# Patient Record
Sex: MALE | Race: WHITE | NOT HISPANIC OR LATINO | Employment: OTHER | URBAN - METROPOLITAN AREA
[De-identification: names, ages, dates, MRNs, and addresses within clinical notes are randomized per-mention and may not be internally consistent; named-entity substitution may affect disease eponyms.]

---

## 2017-02-21 ENCOUNTER — OFFICE VISIT (OUTPATIENT)
Dept: URGENT CARE | Facility: CLINIC | Age: 72
End: 2017-02-21
Payer: MEDICARE

## 2017-02-21 PROCEDURE — G0463 HOSPITAL OUTPT CLINIC VISIT: HCPCS

## 2017-02-21 PROCEDURE — 99214 OFFICE O/P EST MOD 30 MIN: CPT

## 2017-02-21 PROCEDURE — 93005 ELECTROCARDIOGRAM TRACING: CPT

## 2017-02-28 ENCOUNTER — GENERIC CONVERSION - ENCOUNTER (OUTPATIENT)
Dept: OTHER | Facility: OTHER | Age: 72
End: 2017-02-28

## 2017-05-03 ENCOUNTER — ALLSCRIPTS OFFICE VISIT (OUTPATIENT)
Dept: OTHER | Facility: OTHER | Age: 72
End: 2017-05-03

## 2017-05-03 DIAGNOSIS — G47.00 INSOMNIA: ICD-10-CM

## 2017-05-03 DIAGNOSIS — I50.9 HEART FAILURE (HCC): ICD-10-CM

## 2017-05-03 DIAGNOSIS — Z12.5 ENCOUNTER FOR SCREENING FOR MALIGNANT NEOPLASM OF PROSTATE: ICD-10-CM

## 2017-05-03 DIAGNOSIS — R53.83 OTHER FATIGUE: ICD-10-CM

## 2017-05-03 DIAGNOSIS — R25.1 TREMOR: ICD-10-CM

## 2017-05-03 DIAGNOSIS — I10 ESSENTIAL (PRIMARY) HYPERTENSION: ICD-10-CM

## 2017-05-03 DIAGNOSIS — R06.02 SHORTNESS OF BREATH: ICD-10-CM

## 2017-05-10 ENCOUNTER — GENERIC CONVERSION - ENCOUNTER (OUTPATIENT)
Dept: OTHER | Facility: OTHER | Age: 72
End: 2017-05-10

## 2017-06-01 ENCOUNTER — GENERIC CONVERSION - ENCOUNTER (OUTPATIENT)
Dept: OTHER | Facility: OTHER | Age: 72
End: 2017-06-01

## 2017-06-13 ENCOUNTER — GENERIC CONVERSION - ENCOUNTER (OUTPATIENT)
Dept: OTHER | Facility: OTHER | Age: 72
End: 2017-06-13

## 2017-08-09 ENCOUNTER — ALLSCRIPTS OFFICE VISIT (OUTPATIENT)
Dept: OTHER | Facility: OTHER | Age: 72
End: 2017-08-09

## 2017-08-09 DIAGNOSIS — M79.89 OTHER DISORDERS OF SOFT TISSUE: ICD-10-CM

## 2017-09-05 ENCOUNTER — GENERIC CONVERSION - ENCOUNTER (OUTPATIENT)
Dept: OTHER | Facility: OTHER | Age: 72
End: 2017-09-05

## 2017-09-07 ENCOUNTER — ALLSCRIPTS OFFICE VISIT (OUTPATIENT)
Dept: OTHER | Facility: OTHER | Age: 72
End: 2017-09-07

## 2017-10-06 ENCOUNTER — GENERIC CONVERSION - ENCOUNTER (OUTPATIENT)
Dept: OTHER | Facility: OTHER | Age: 72
End: 2017-10-06

## 2017-10-10 ENCOUNTER — ALLSCRIPTS OFFICE VISIT (OUTPATIENT)
Dept: OTHER | Facility: OTHER | Age: 72
End: 2017-10-10

## 2017-10-13 NOTE — PROGRESS NOTES
Assessment  1  Congestive heart failure (428 0) (I50 9)    Discussion/Summary  Discussion Summary:   Will continue current regime  Continue with monthly visits as pt is terminally ill  Chief Complaint  Chief Complaint Free Text Note Form: Pt presents today for 1 month follow up  Pt states that edema is a little better but still has SOB with exertion  History of Present Illness  HPI: Pt presents for follow up  His lower extremity swelling has improved since his cardiologist placed him on metalazone  His SOB has also improved a little bit  He is no longer wearing the life vest  His weight is down since last visit and likely due to no longer being fluid overloaded  Review of Systems  Complete-Male:   Constitutional: as noted in HPI  Eyes: No complaints of eye pain, no red eyes, no discharge from eyes, no itchy eyes  ENT: no complaints of earache, no hearing loss, no nosebleeds, no nasal discharge, no sore throat, no hoarseness  Cardiovascular: No complaints of slow heart rate, no fast heart rate, no chest pain, no palpitations, no leg claudication, no lower extremity  Respiratory: No complaints of shortness of breath, no wheezing, no cough, no SOB on exertion, no orthopnea or PND  Gastrointestinal: No complaints of abdominal pain, no constipation, no nausea or vomiting, no diarrhea or bloody stools  Genitourinary: No complaints of dysuria, no incontinence, no hesitancy, no nocturia, no genital lesion, no testicular pain  Musculoskeletal: No complaints of arthralgia, no myalgias, no joint swelling or stiffness, no limb pain or swelling  Integumentary: No complaints of skin rash or skin lesions, no itching, no skin wound, no dry skin  Neurological: No compliants of headache, no confusion, no convulsions, no numbness or tingling, no dizziness or fainting, no limb weakness, no difficulty walking     Hematologic/Lymphatic: No complaints of swollen glands, no swollen glands in the neck, does not bleed easily, no easy bruising  ROS Reviewed:   ROS reviewed  Active Problems  1  Atrial fibrillation (427 31) (I48 91)   2  Congestive heart failure (428 0) (I50 9)   3  Encounter for screening for malignant neoplasm of prostate (V76 44) (Z12 5)   4  Fatigue (780 79) (R53 83)   5  Hyperlipidemia (272 4) (E78 5)   6  Hypertension (401 9) (I10)   7  Insomnia (780 52) (G47 00)   8  Myelodysplasia (myelodysplastic syndrome) (238 75) (D46 9)   9  Screening for colon cancer (V76 51) (Z12 11)   10  Sebaceous cyst (706 2) (L72 3)   11  Shortness of breath (786 05) (R06 02)   12  Swelling of extremity (729 81) (M79 89)   13  Tremor (781 0) (R25 1)    Past Medical History  1  History of Cellulitis of hand, left (682 4) (L03 114)   2  History of low back pain (V13 59) (Z87 39)   3  History of Swelling of right hand (729 81) (M79 89)  Active Problems And Past Medical History Reviewed: The active problems and past medical history were reviewed and updated today  Surgical History  1  History of Brain Surgery   2  History of Hernia Repair   3  History of Leg Repair   4  History of Surgery For Abdominal Aortic Aneurysm  Surgical History Reviewed: The surgical history was reviewed and updated today  Family History  Mother    1  No pertinent family history  Father    2  Adopted (V68 89) (Z02 82)  Family History Reviewed: The family history was reviewed and updated today  Social History   · Former smoker (K86 18) (H03 582)   · No alcohol use   · Retired  Social History Reviewed: The social history was reviewed and updated today  The social history was reviewed and is unchanged  Current Meds   1  Lipitor 20 MG Oral Tablet; TAKE 1 TABLET DAILY; Therapy: 87MMI6600 to (Evaluate:26Tew2864) Recorded   2  MetOLazone 2 5 MG Oral Tablet; take 1 tablet daily as needed; Therapy: (Recorded:10Oct2017) to Recorded   3  Oxazepam 10 MG Oral Capsule; TAKE 1 CAPSULE Bedtime;    Therapy: 01SNR3557 to 24 200435); Last Rx:[id number] Ordered   4  Potassium Chloride ER 20 MEQ Oral Tablet Extended Release; Take 1 tablet daily; Therapy: 65QDR2856 to (Evaluate:28Apr2018) Recorded   5  Toprol XL 50 MG Oral Tablet Extended Release 24 Hour; take 0 5 tablet daily; Therapy: 88TNE4575 to Recorded   6  Torsemide 20 MG Oral Tablet; TAKE 1 TABLET DAILY; Therapy: 63DLN7458 to (Evaluate:30Oct2017) Recorded  Medication List Reviewed: The medication list was reviewed and updated today  Allergies  1  No Known Drug Allergies    Vitals  Vital Signs    Recorded: 19ZBK2214 02:30PM   Temperature 97 9 F   Heart Rate 84   Respiration 20   Systolic 812   Diastolic 64   Height 6 ft    Weight 243 lb    BMI Calculated 32 96   BSA Calculated 2 31   O2 Saturation 98     Physical Exam    Constitutional   General appearance: No acute distress, well appearing and well nourished  Pulmonary   Respiratory effort: No increased work of breathing or signs of respiratory distress  Auscultation of lungs: Clear to auscultation, equal breath sounds bilaterally, no wheezes, no rales, no rhonci  Cardiovascular   Auscultation of heart: Normal rate and rhythm, normal S1 and S2, without murmurs  Examination of extremities for edema and/or varicosities: Normal     Carotid pulses: Normal     Abdomen   Abdomen: Non-tender, no masses  Musculoskeletal   Gait and station: Normal     Digits and nails: Normal without clubbing or cyanosis  Inspection/palpation of joints, bones, and muscles: Normal     Skin   Skin and subcutaneous tissue: Normal without rashes or lesions      Psychiatric   Orientation to person, place and time: Normal     Mood and affect: Normal          Future Appointments    Date/Time Provider Specialty Site   11/09/2017 02:15 PM Michelle Andrade [address], [address]   Electronically signed by : Ayaz Munguia [facility]; Oct 11 2017  2:52PM EST                       (Author) Electronically signed by : AIDA Sepulveda ; Oct 11 2017  3:34PM EST                       (Author)

## 2017-11-03 ENCOUNTER — GENERIC CONVERSION - ENCOUNTER (OUTPATIENT)
Dept: OTHER | Facility: OTHER | Age: 72
End: 2017-11-03

## 2017-11-07 ENCOUNTER — GENERIC CONVERSION - ENCOUNTER (OUTPATIENT)
Dept: OTHER | Facility: OTHER | Age: 72
End: 2017-11-07

## 2017-11-09 ENCOUNTER — ALLSCRIPTS OFFICE VISIT (OUTPATIENT)
Dept: OTHER | Facility: OTHER | Age: 72
End: 2017-11-09

## 2017-12-20 ENCOUNTER — GENERIC CONVERSION - ENCOUNTER (OUTPATIENT)
Dept: FAMILY MEDICINE CLINIC | Facility: CLINIC | Age: 72
End: 2017-12-20

## 2018-01-12 VITALS
SYSTOLIC BLOOD PRESSURE: 100 MMHG | HEIGHT: 72 IN | WEIGHT: 243 LBS | TEMPERATURE: 97.9 F | RESPIRATION RATE: 20 BRPM | DIASTOLIC BLOOD PRESSURE: 64 MMHG | OXYGEN SATURATION: 98 % | HEART RATE: 84 BPM | BODY MASS INDEX: 32.91 KG/M2

## 2018-01-12 VITALS
SYSTOLIC BLOOD PRESSURE: 98 MMHG | BODY MASS INDEX: 35.21 KG/M2 | HEART RATE: 84 BPM | HEIGHT: 72 IN | TEMPERATURE: 96.7 F | WEIGHT: 260 LBS | DIASTOLIC BLOOD PRESSURE: 62 MMHG | RESPIRATION RATE: 24 BRPM

## 2018-01-13 VITALS
WEIGHT: 240 LBS | HEIGHT: 72 IN | RESPIRATION RATE: 22 BRPM | TEMPERATURE: 98.4 F | SYSTOLIC BLOOD PRESSURE: 132 MMHG | DIASTOLIC BLOOD PRESSURE: 78 MMHG | HEART RATE: 88 BPM | BODY MASS INDEX: 32.51 KG/M2

## 2018-01-14 VITALS
WEIGHT: 257 LBS | DIASTOLIC BLOOD PRESSURE: 60 MMHG | RESPIRATION RATE: 24 BRPM | SYSTOLIC BLOOD PRESSURE: 98 MMHG | TEMPERATURE: 96.7 F | BODY MASS INDEX: 34.81 KG/M2 | HEIGHT: 72 IN | HEART RATE: 92 BPM

## 2018-01-15 VITALS
RESPIRATION RATE: 18 BRPM | OXYGEN SATURATION: 90 % | DIASTOLIC BLOOD PRESSURE: 64 MMHG | HEIGHT: 72 IN | SYSTOLIC BLOOD PRESSURE: 102 MMHG | WEIGHT: 235.38 LBS | TEMPERATURE: 97.9 F | BODY MASS INDEX: 31.88 KG/M2 | HEART RATE: 116 BPM

## 2018-01-31 RX ORDER — METOPROLOL SUCCINATE 50 MG/1
0.5 TABLET, EXTENDED RELEASE ORAL DAILY
COMMUNITY
Start: 2017-05-03 | End: 2018-03-01

## 2018-01-31 RX ORDER — ATORVASTATIN CALCIUM 20 MG/1
1 TABLET, FILM COATED ORAL DAILY
COMMUNITY
Start: 2017-08-09

## 2018-01-31 RX ORDER — POTASSIUM CHLORIDE 1500 MG/1
1 TABLET, FILM COATED, EXTENDED RELEASE ORAL DAILY
COMMUNITY
Start: 2017-05-03

## 2018-01-31 RX ORDER — METOLAZONE 2.5 MG/1
1 TABLET ORAL DAILY PRN
COMMUNITY

## 2018-01-31 RX ORDER — TORSEMIDE 20 MG/1
1 TABLET ORAL DAILY
COMMUNITY
Start: 2017-05-03

## 2018-01-31 RX ORDER — OXAZEPAM 10 MG
1 CAPSULE ORAL
COMMUNITY
Start: 2017-05-03 | End: 2018-02-02 | Stop reason: SDUPTHER

## 2018-02-01 ENCOUNTER — OFFICE VISIT (OUTPATIENT)
Dept: INTERNAL MEDICINE CLINIC | Facility: CLINIC | Age: 73
End: 2018-02-01
Payer: MEDICARE

## 2018-02-01 VITALS
OXYGEN SATURATION: 98 % | BODY MASS INDEX: 30.93 KG/M2 | SYSTOLIC BLOOD PRESSURE: 118 MMHG | DIASTOLIC BLOOD PRESSURE: 68 MMHG | RESPIRATION RATE: 18 BRPM | WEIGHT: 241 LBS | HEIGHT: 74 IN | TEMPERATURE: 97.4 F | HEART RATE: 52 BPM

## 2018-02-01 DIAGNOSIS — G47.00 INSOMNIA, UNSPECIFIED TYPE: Primary | ICD-10-CM

## 2018-02-01 DIAGNOSIS — Z12.11 SCREENING FOR COLON CANCER: ICD-10-CM

## 2018-02-01 DIAGNOSIS — I87.2 VENOUS STASIS DERMATITIS OF BOTH LOWER EXTREMITIES: Primary | ICD-10-CM

## 2018-02-01 PROBLEM — I48.91 ATRIAL FIBRILLATION (HCC): Status: ACTIVE | Noted: 2017-08-10

## 2018-02-01 PROBLEM — F41.9 ANXIETY: Status: ACTIVE | Noted: 2017-04-05

## 2018-02-01 PROBLEM — Z96.89 S/P DEEP BRAIN STIMULATOR PLACEMENT: Status: ACTIVE | Noted: 2017-04-05

## 2018-02-01 PROBLEM — E78.5 HYPERLIPIDEMIA: Status: ACTIVE | Noted: 2017-08-09

## 2018-02-01 PROBLEM — N18.30 CKD (CHRONIC KIDNEY DISEASE) STAGE 3, GFR 30-59 ML/MIN (HCC): Status: ACTIVE | Noted: 2017-04-05

## 2018-02-01 PROBLEM — D46.Z MDS (MYELODYSPLASTIC SYNDROME), HIGH GRADE (HCC): Status: ACTIVE | Noted: 2017-06-01

## 2018-02-01 PROBLEM — I50.23 ACUTE ON CHRONIC SYSTOLIC CHF (CONGESTIVE HEART FAILURE) (HCC): Status: ACTIVE | Noted: 2017-04-21

## 2018-02-01 PROBLEM — Z86.73 HISTORY OF STROKE: Status: ACTIVE | Noted: 2017-04-05

## 2018-02-01 PROBLEM — I25.5 CARDIOMYOPATHY, ISCHEMIC: Status: ACTIVE | Noted: 2017-10-06

## 2018-02-01 PROBLEM — R06.00 DYSPNEA: Status: ACTIVE | Noted: 2017-04-05

## 2018-02-01 PROBLEM — I50.22 CHRONIC SYSTOLIC CHF (CONGESTIVE HEART FAILURE) (HCC): Status: ACTIVE | Noted: 2017-06-12

## 2018-02-01 PROCEDURE — 99214 OFFICE O/P EST MOD 30 MIN: CPT | Performed by: PHYSICIAN ASSISTANT

## 2018-02-01 RX ORDER — DIPHENOXYLATE HYDROCHLORIDE AND ATROPINE SULFATE 2.5; .025 MG/1; MG/1
1 TABLET ORAL
COMMUNITY
Start: 2008-02-19

## 2018-02-01 RX ORDER — DOCUSATE SODIUM 100 MG/1
1 CAPSULE, LIQUID FILLED ORAL
COMMUNITY

## 2018-02-01 RX ORDER — OXAZEPAM 10 MG
10 CAPSULE ORAL
Qty: 90 CAPSULE | Refills: 3 | OUTPATIENT
Start: 2018-02-01 | End: 2018-02-02

## 2018-02-01 NOTE — ASSESSMENT & PLAN NOTE
Continue current medications  Will refer to vascular for an opinion  Pt will likely need vascular studies but unsure if he is willing to proceed with these   Consider medical management with Pletal?

## 2018-02-01 NOTE — PROGRESS NOTES
Assessment/Plan:    Venous stasis dermatitis of both lower extremities  Continue current medications  Will refer to vascular for an opinion  Pt will likely need vascular studies but unsure if he is willing to proceed with these  Consider medical management with Pletal?       Diagnoses and all orders for this visit:    Venous stasis dermatitis of both lower extremities  -     Ambulatory referral to Vascular Surgery; Future    Screening for colon cancer  -     Ambulatory referral to Gastroenterology; Future  -     Ambulatory referral to Gastroenterology; Future    Other orders  -     atorvastatin (LIPITOR) 20 mg tablet; Take 1 tablet by mouth daily  -     metolazone (ZAROXOLYN) 2 5 mg tablet; Take 1 tablet by mouth daily as needed  -     oxazepam (SERAX) 10 mg capsule; Take 1 capsule by mouth  -     Potassium Chloride ER 20 MEQ TBCR; Take 1 tablet by mouth daily  -     metoprolol succinate (TOPROL XL) 50 mg 24 hr tablet; Take 0 5 tablets by mouth daily  -     torsemide (DEMADEX) 20 mg tablet; Take 1 tablet by mouth daily  -     docusate sodium (COLACE) 100 mg capsule; Take 1 capsule by mouth  -     multivitamin (THERAGRAN) TABS; Take 1 tablet by mouth          Subjective:      Patient ID: Shay Martinez is a 67 y o  male  Pt presents complaining of bilateral lower extremity swelling, discoloration and discomfort  He has some ulcer formation consistent with venous stasis ulcers  He has a hx of CHF and lower extremity edema that is managed fairly well with his diuretics  His legs feel heavy and uncomfortable  He is unsure how aggressive he would like to be with this as he has high grade MDS  Leg Pain    The incident occurred more than 1 week ago  There was no injury mechanism  The pain is present in the left ankle, left foot, left toes, right ankle, right foot and right toes  The pain is at a severity of 5/10  The pain is mild  The pain has been fluctuating since onset   Associated symptoms include a loss of sensation  He reports no foreign bodies present  The symptoms are aggravated by movement  Treatments tried: diuretic  The treatment provided mild relief  The following portions of the patient's history were reviewed and updated as appropriate:   He  has a past medical history of Anxiety (4/5/2017); CKD (chronic kidney disease) stage 3, GFR 30-59 ml/min (4/5/2017); Hypertension (5/20/2015); MDS (myelodysplastic syndrome), high grade ([facility] [address] ) (6/1/2017); and Pancytopenia ([facility] [address] ) (12/15/2016)  He  does not have any pertinent problems on file  He  has no past surgical history on file  His family history is not on file  He was adopted  He  reports that he has never smoked  He has never used smokeless tobacco  He reports that he does not drink alcohol or use drugs  Current Outpatient Prescriptions   Medication Sig Dispense Refill    atorvastatin (LIPITOR) 20 mg tablet Take 1 tablet by mouth daily      docusate sodium (COLACE) 100 mg capsule Take 1 capsule by mouth      multivitamin (THERAGRAN) TABS Take 1 tablet by mouth      oxazepam (SERAX) 10 mg capsule Take 1 capsule by mouth      Potassium Chloride ER 20 MEQ TBCR Take 1 tablet by mouth daily      torsemide (DEMADEX) 20 mg tablet Take 1 tablet by mouth daily      metolazone (ZAROXOLYN) 2 5 mg tablet Take 1 tablet by mouth daily as needed      metoprolol succinate (TOPROL XL) 50 mg 24 hr tablet Take 0 5 tablets by mouth daily       No current facility-administered medications for this visit  No current outpatient prescriptions on file prior to visit  No current facility-administered medications on file prior to visit  He has No Known Allergies       Review of Systems   Constitutional: Negative for chills and fever  HENT: Negative for congestion, ear pain, hearing loss, postnasal drip, rhinorrhea, sinus pain, sinus pressure, sore throat and trouble swallowing  Eyes: Negative for pain and visual disturbance     Respiratory: Negative for cough, chest tightness, shortness of breath and wheezing  Cardiovascular: Positive for leg swelling  Negative for chest pain and palpitations  Gastrointestinal: Negative for abdominal pain, blood in stool, constipation, diarrhea, nausea and vomiting  Endocrine: Negative for cold intolerance, heat intolerance, polydipsia, polyphagia and polyuria  Genitourinary: Negative for difficulty urinating, dysuria, flank pain and urgency  Musculoskeletal: Negative for arthralgias, back pain, gait problem and myalgias  Skin: Negative for rash  Allergic/Immunologic: Negative  Neurological: Negative for dizziness, weakness, light-headedness and headaches  Hematological: Negative  Psychiatric/Behavioral: Negative for behavioral problems, dysphoric mood and sleep disturbance  The patient is not nervous/anxious  Objective:     Physical Exam   Constitutional: He is oriented to person, place, and time  He appears well-developed and well-nourished  No distress  HENT:   Head: Normocephalic and atraumatic  Right Ear: External ear normal    Left Ear: External ear normal    Nose: Nose normal    Mouth/Throat: Oropharynx is clear and moist  No oropharyngeal exudate  Eyes: Conjunctivae and EOM are normal  Pupils are equal, round, and reactive to light  Right eye exhibits no discharge  Left eye exhibits no discharge  No scleral icterus  Neck: Normal range of motion  Neck supple  No thyromegaly present  Cardiovascular: Normal rate and normal heart sounds  An irregular rhythm present  Exam reveals decreased pulses  Exam reveals no gallop and no friction rub  No murmur heard  Pulmonary/Chest: Effort normal and breath sounds normal  No respiratory distress  He has no wheezes  He has no rales  Abdominal: Soft  Bowel sounds are normal  He exhibits no distension  There is no tenderness  Musculoskeletal: Normal range of motion  He exhibits no edema, tenderness or deformity     Neurological: He is alert and oriented to person, place, and time  No cranial nerve deficit  Skin: Skin is warm and dry  He is not diaphoretic  Red/purple discoloration to both lower extremity from the level of the shin downward consistent with poor circulation  Psychiatric: He has a normal mood and affect   His behavior is normal  Judgment and thought content normal

## 2018-02-02 DIAGNOSIS — F41.9 ANXIETY: Primary | ICD-10-CM

## 2018-02-02 DIAGNOSIS — F41.9 ANXIETY: ICD-10-CM

## 2018-02-02 RX ORDER — OXAZEPAM 10 MG
10 CAPSULE ORAL
Qty: 30 CAPSULE | Refills: 1 | OUTPATIENT
Start: 2018-02-02

## 2018-02-02 RX ORDER — OXAZEPAM 10 MG
10 CAPSULE ORAL
Qty: 30 CAPSULE | Refills: 0 | Status: SHIPPED | OUTPATIENT
Start: 2018-02-02 | End: 2018-03-01 | Stop reason: SDUPTHER

## 2018-02-02 NOTE — TELEPHONE ENCOUNTER
Pt need refill for his Oxazepam 10 mg take 1 at Bedtime  #90  Send to [address] [place] [address]    [phone]

## 2018-03-01 ENCOUNTER — OFFICE VISIT (OUTPATIENT)
Dept: INTERNAL MEDICINE CLINIC | Facility: CLINIC | Age: 73
End: 2018-03-01
Payer: MEDICARE

## 2018-03-01 VITALS
SYSTOLIC BLOOD PRESSURE: 120 MMHG | TEMPERATURE: 98.4 F | WEIGHT: 236 LBS | HEART RATE: 76 BPM | BODY MASS INDEX: 30.29 KG/M2 | OXYGEN SATURATION: 96 % | DIASTOLIC BLOOD PRESSURE: 72 MMHG | HEIGHT: 74 IN | RESPIRATION RATE: 18 BRPM

## 2018-03-01 DIAGNOSIS — D46.Z MDS (MYELODYSPLASTIC SYNDROME), HIGH GRADE (HCC): Primary | ICD-10-CM

## 2018-03-01 DIAGNOSIS — F41.9 ANXIETY: ICD-10-CM

## 2018-03-01 DIAGNOSIS — I25.5 CARDIOMYOPATHY, ISCHEMIC: ICD-10-CM

## 2018-03-01 DIAGNOSIS — R60.0 LOCALIZED EDEMA: ICD-10-CM

## 2018-03-01 PROBLEM — Z86.73 HISTORY OF STROKE: Status: RESOLVED | Noted: 2017-04-05 | Resolved: 2018-03-01

## 2018-03-01 PROCEDURE — 99214 OFFICE O/P EST MOD 30 MIN: CPT | Performed by: PHYSICIAN ASSISTANT

## 2018-03-01 RX ORDER — OXAZEPAM 10 MG
10 CAPSULE ORAL
Qty: 30 CAPSULE | Refills: 3 | Status: SHIPPED | OUTPATIENT
Start: 2018-03-01

## 2018-03-01 RX ORDER — POTASSIUM CHLORIDE 20 MEQ/1
TABLET, EXTENDED RELEASE ORAL
COMMUNITY
Start: 2018-02-14

## 2018-03-01 RX ORDER — METOPROLOL SUCCINATE 25 MG/1
1 TABLET, EXTENDED RELEASE ORAL 2 TIMES DAILY
COMMUNITY
Start: 2018-02-21

## 2018-03-01 NOTE — PROGRESS NOTES
Assessment/Plan:    No problem-specific Assessment & Plan notes found for this encounter  Diagnoses and all orders for this visit:    MDS (myelodysplastic syndrome), high grade (HCC)    Anxiety  -     oxazepam (SERAX) 10 mg capsule; Take 1 capsule (10 mg total) by mouth daily at bedtime as needed for sleep or anxiety    Other orders  -     metoprolol succinate (TOPROL-XL) 25 mg 24 hr tablet; Take 1 tablet by mouth 2 (two) times a day  -     potassium chloride (K-DUR,KLOR-CON) 20 mEq tablet;           Subjective:      Patient ID: Max Darling is a 67 y o  male  Pt presents for routine visit  He has high grade myelodysplastic syndrome with a  Poor prognosis  He had a transfusion yesterday and is feeling a little better than he had been  He continues to follow with cardiology for his ischemic cardiomyopathy    He denies any new complaints or concerns  Serax remains beneficial for his insomnia  He is in the process of getting compression stockings for his edema  The following portions of the patient's history were reviewed and updated as appropriate:   He  has a past medical history of Anxiety (4/5/2017); Cellulitis of hand, left; CKD (chronic kidney disease) stage 3, GFR 30-59 ml/min (4/5/2017); Hypertension (5/20/2015); MDS (myelodysplastic syndrome), high grade ([facility] [address] ) (6/1/2017); and Pancytopenia ([facility] [address] ) (12/15/2016)    He   Patient Active Problem List    Diagnosis Date Noted    Venous stasis dermatitis of both lower extremities 02/01/2018    Cardiomyopathy, ischemic 10/06/2017    Atrial fibrillation ([facility] [address] ) 08/10/2017    Hyperlipidemia 08/09/2017    Chronic systolic CHF (congestive heart failure) ([facility] [address] ) 06/12/2017    MDS (myelodysplastic syndrome), high grade ([address] ) 06/01/2017    Acute on chronic systolic CHF (congestive heart failure) ([facility] [address] ) 04/21/2017    Anxiety 04/05/2017    CKD (chronic kidney disease) stage 3, GFR 30-59 ml/min 04/05/2017    Dyspnea 04/05/2017    History of stroke 04/05/2017  S/P deep brain stimulator placement 04/05/2017    [facility] 12/15/2016    Pancytopenia ([facility] ) 12/15/2016    PVD (peripheral vascular disease) ([facility] ) 12/15/2016    Essential tremor 12/14/2016    Hypertension 05/20/2015     He  has a past surgical history that includes Leg Surgery and Abdominal aortic aneurysm repair  His family history is not on file  He was adopted  He  reports that he has never smoked  He has never used smokeless tobacco  He reports that he does not drink alcohol or use drugs  Current Outpatient Prescriptions   Medication Sig Dispense Refill    atorvastatin (LIPITOR) 20 mg tablet Take 1 tablet by mouth daily      docusate sodium (COLACE) 100 mg capsule Take 1 capsule by mouth      metolazone (ZAROXOLYN) 2 5 mg tablet Take 1 tablet by mouth daily as needed      metoprolol succinate (TOPROL-XL) 25 mg 24 hr tablet Take 1 tablet by mouth 2 (two) times a day      multivitamin (THERAGRAN) TABS Take 1 tablet by mouth      oxazepam (SERAX) 10 mg capsule Take 1 capsule (10 mg total) by mouth daily at bedtime as needed for sleep or anxiety 30 capsule 3    potassium chloride (K-DUR,KLOR-CON) 20 mEq tablet       Potassium Chloride ER 20 MEQ TBCR Take 1 tablet by mouth daily      torsemide (DEMADEX) 20 mg tablet Take 1 tablet by mouth daily       No current facility-administered medications for this visit        Current Outpatient Prescriptions on File Prior to Visit   Medication Sig    atorvastatin (LIPITOR) 20 mg tablet Take 1 tablet by mouth daily    docusate sodium (COLACE) 100 mg capsule Take 1 capsule by mouth    metolazone (ZAROXOLYN) 2 5 mg tablet Take 1 tablet by mouth daily as needed    multivitamin (THERAGRAN) TABS Take 1 tablet by mouth    Potassium Chloride ER 20 MEQ TBCR Take 1 tablet by mouth daily    torsemide (DEMADEX) 20 mg tablet Take 1 tablet by mouth daily    [DISCONTINUED] metoprolol succinate (TOPROL XL) 50 mg 24 hr tablet Take 0 5 tablets by mouth daily    [DISCONTINUED] oxazepam (SERAX) 10 mg capsule Take 1 capsule (10 mg total) by mouth daily at bedtime as needed for sleep or anxiety     No current facility-administered medications on file prior to visit  He is allergic to ace inhibitors       Review of Systems   Constitutional: Negative for chills and fever  HENT: Negative for congestion, ear pain, hearing loss, postnasal drip, rhinorrhea, sinus pain, sinus pressure, sore throat and trouble swallowing  Eyes: Negative for pain and visual disturbance  Respiratory: Negative for cough, chest tightness, shortness of breath and wheezing  Cardiovascular: Negative  Negative for chest pain, palpitations and leg swelling  Gastrointestinal: Negative for abdominal pain, blood in stool, constipation, diarrhea, nausea and vomiting  Endocrine: Negative for cold intolerance, heat intolerance, polydipsia, polyphagia and polyuria  Genitourinary: Negative for difficulty urinating, dysuria, flank pain and urgency  Musculoskeletal: Negative for arthralgias, back pain, gait problem and myalgias  Skin: Negative for rash  Allergic/Immunologic: Negative  Neurological: Negative for dizziness, weakness, light-headedness and headaches  Hematological: Negative  Psychiatric/Behavioral: Negative for behavioral problems, dysphoric mood and sleep disturbance  The patient is not nervous/anxious  Objective:      /72 (BP Location: Left arm, Patient Position: Sitting, Cuff Size: Adult)   Pulse 76   Temp 98 4 °F (36 9 °C) (Tympanic)   Resp 18   Ht 6' 2" (1 88 m)   Wt 107 kg (236 lb)   SpO2 96%   BMI 30 30 kg/m²          Physical Exam   Constitutional: He is oriented to person, place, and time  He appears well-developed and well-nourished  No distress  HENT:   Head: Normocephalic and atraumatic     Right Ear: External ear normal    Left Ear: External ear normal    Nose: Nose normal    Mouth/Throat: Oropharynx is clear and moist  No oropharyngeal exudate  Eyes: Conjunctivae and EOM are normal  Pupils are equal, round, and reactive to light  Right eye exhibits no discharge  Left eye exhibits no discharge  No scleral icterus  Neck: Normal range of motion  Neck supple  No thyromegaly present  Cardiovascular: Normal rate, regular rhythm and normal heart sounds  Exam reveals no gallop and no friction rub  No murmur heard  Pulmonary/Chest: Effort normal and breath sounds normal  No respiratory distress  He has no wheezes  He has no rales  Abdominal: Soft  Bowel sounds are normal  He exhibits no distension  There is no tenderness  Musculoskeletal: Normal range of motion  He exhibits no edema, tenderness or deformity  Neurological: He is alert and oriented to person, place, and time  No cranial nerve deficit  Skin: Skin is warm and dry  He is not diaphoretic  Psychiatric: He has a normal mood and affect   His behavior is normal  Judgment and thought content normal

## 2018-03-13 ENCOUNTER — APPOINTMENT (OUTPATIENT)
Dept: LAB | Facility: CLINIC | Age: 73
End: 2018-03-13
Payer: MEDICARE

## 2018-03-13 ENCOUNTER — TRANSCRIBE ORDERS (OUTPATIENT)
Dept: LAB | Facility: CLINIC | Age: 73
End: 2018-03-13

## 2018-03-13 DIAGNOSIS — D73.2 CONGESTIVE SPLENOMEGALY: ICD-10-CM

## 2018-03-13 DIAGNOSIS — D61.818 PANCYTOPENIA (HCC): Primary | ICD-10-CM

## 2018-03-13 DIAGNOSIS — K70.9 ALCOHOL LIVER DAMAGE (HCC): ICD-10-CM

## 2018-03-13 DIAGNOSIS — D46.Z MDS (MYELODYSPLASTIC SYNDROME), HIGH GRADE (HCC): ICD-10-CM

## 2018-03-13 LAB
ALBUMIN SERPL BCP-MCNC: 3.4 G/DL (ref 3.5–5)
ALP SERPL-CCNC: 75 U/L (ref 46–116)
ALT SERPL W P-5'-P-CCNC: 35 U/L (ref 12–78)
ANION GAP SERPL CALCULATED.3IONS-SCNC: 10 MMOL/L (ref 4–13)
AST SERPL W P-5'-P-CCNC: 29 U/L (ref 5–45)
BASOPHILS # BLD AUTO: 0.01 THOUSANDS/ΜL (ref 0–0.1)
BASOPHILS NFR BLD AUTO: 1 % (ref 0–1)
BILIRUB SERPL-MCNC: 1.83 MG/DL (ref 0.2–1)
BUN SERPL-MCNC: 38 MG/DL (ref 5–25)
CALCIUM SERPL-MCNC: 9 MG/DL (ref 8.3–10.1)
CHLORIDE SERPL-SCNC: 101 MMOL/L (ref 100–108)
CO2 SERPL-SCNC: 29 MMOL/L (ref 21–32)
CREAT SERPL-MCNC: 2.17 MG/DL (ref 0.6–1.3)
EOSINOPHIL # BLD AUTO: 0.02 THOUSAND/ΜL (ref 0–0.61)
EOSINOPHIL NFR BLD AUTO: 2 % (ref 0–6)
GFR SERPL CREATININE-BSD FRML MDRD: 29 ML/MIN/1.73SQ M
GLUCOSE SERPL-MCNC: 104 MG/DL (ref 65–140)
HCT VFR BLD AUTO: 26.4 % (ref 36.5–49.3)
HGB BLD-MCNC: 8.6 G/DL (ref 12–17)
LYMPHOCYTES # BLD AUTO: 0.63 THOUSANDS/ΜL (ref 0.6–4.47)
LYMPHOCYTES NFR BLD AUTO: 48 % (ref 14–44)
MCH RBC QN AUTO: 37.6 PG (ref 26.8–34.3)
MCHC RBC AUTO-ENTMCNC: 32.6 G/DL (ref 31.4–37.4)
MCV RBC AUTO: 115 FL (ref 82–98)
MONOCYTES # BLD AUTO: 0.07 THOUSAND/ΜL (ref 0.17–1.22)
MONOCYTES NFR BLD AUTO: 6 % (ref 4–12)
NEUTROPHILS # BLD AUTO: 0.55 THOUSANDS/ΜL (ref 1.85–7.62)
NEUTS SEG NFR BLD AUTO: 43 % (ref 43–75)
NRBC BLD AUTO-RTO: 0 /100 WBCS
PLATELET # BLD AUTO: 20 THOUSANDS/UL (ref 149–390)
POTASSIUM SERPL-SCNC: 3.8 MMOL/L (ref 3.5–5.3)
PROT SERPL-MCNC: 7.8 G/DL (ref 6.4–8.2)
RBC # BLD AUTO: 2.29 MILLION/UL (ref 3.88–5.62)
SODIUM SERPL-SCNC: 140 MMOL/L (ref 136–145)
WBC # BLD AUTO: 1.28 THOUSAND/UL (ref 4.31–10.16)

## 2018-03-13 PROCEDURE — 85025 COMPLETE CBC W/AUTO DIFF WBC: CPT

## 2018-03-13 PROCEDURE — 36415 COLL VENOUS BLD VENIPUNCTURE: CPT

## 2018-03-13 PROCEDURE — 80053 COMPREHEN METABOLIC PANEL: CPT
